# Patient Record
Sex: FEMALE | Race: OTHER | URBAN - METROPOLITAN AREA
[De-identification: names, ages, dates, MRNs, and addresses within clinical notes are randomized per-mention and may not be internally consistent; named-entity substitution may affect disease eponyms.]

---
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
## 2019-04-09 DIAGNOSIS — L70.0 ACNE VULGARIS: Chronic | ICD-10-CM

## 2019-04-09 DIAGNOSIS — N94.6 DYSMENORRHEA IN THE ADOLESCENT: Chronic | ICD-10-CM

## 2019-04-10 RX ORDER — NORGESTIMATE AND ETHINYL ESTRADIOL 7DAYSX3 28
1 KIT ORAL DAILY
Qty: 84 TAB | Refills: 3 | Status: SHIPPED | OUTPATIENT
Start: 2019-04-10 | End: 2019-10-04 | Stop reason: SDUPTHER

## 2019-04-10 RX ORDER — CLINDAMYCIN AND BENZOYL PEROXIDE 10; 50 MG/G; MG/G
GEL TOPICAL 2 TIMES DAILY
Qty: 25 G | Refills: 3 | Status: SHIPPED | OUTPATIENT
Start: 2019-04-10 | End: 2020-07-10

## 2019-10-03 NOTE — PATIENT INSTRUCTIONS
No change to your meds. Sent refills to your pharmacy. You will be due for your pap once you turn 20. Pelvic Exam: Care Instructions Your Care Instructions When your doctor examines all of your pelvic organs, it's called a pelvic exam. Two good reasons to have this kind of exam are to check for sexually transmitted infections (STIs) and to get a Pap test. A Pap test is also called a Pap smear. It checks for early changes that can lead to cancer of the cervix. Sometimes a pelvic exam is part of a regular checkup. In this case, you can do some things to make your test results as accurate as possible. · Try to schedule the exam when you don't have your period. · Don't use douches, tampons, or vaginal medicines, sprays, or powders for 24 hours before your exam. 
· Don't have sex for 24 hours before your exam. 
Other times, women have this kind of exam at any time of the month. This is because they have pelvic pain, bleeding, or discharge. Or they may have another pelvic problem. Before your exam, it's important to share some information with your doctor. For example, if you are a survivor of rape or sexual abuse, you can talk about any concerns you may have. Your doctor will also want to know if you are pregnant or use birth control. And he or she will want to hear about any problems, surgeries, or procedures you have had in your pelvic area. You will also need to tell your doctor when your last period was. Follow-up care is a key part of your treatment and safety. Be sure to make and go to all appointments, and call your doctor if you are having problems. It's also a good idea to know your test results and keep a list of the medicines you take. How is a pelvic exam done? · During a pelvic exam, you will: ? Take off your clothes below the waist. You will get a paper or cloth cover to put over the lower half of your body. ? Lie on your back on an exam table. Your feet will be raised above you. Stirrups will support your feet. · The doctor will: ? Ask you to relax your knees. Your knees need to lean out, toward the walls. ? Check the opening of your vagina for sores or swelling. ? Gently put a tool called a speculum into your vagina. It opens the vagina a little bit. You will feel some pressure. But if you are relaxed, it will not hurt. It lets your doctor see inside the vagina. ? Use a small brush, spatula, or swab to get a sample of cells, if you are having a Pap test or culture. The doctor then removes the speculum. ? Put on gloves and put one or two fingers of one hand into your vagina. The other hand goes on your lower belly. This lets your doctor feel your pelvic organs. You will probably feel some pressure. Try to stay relaxed. ? Put one gloved finger into your rectum and one into your vagina, if needed. This can also help check your pelvic organs. This exam takes about 10 minutes. At the end, you will get a washcloth or tissue to clean your vaginal area. It's normal to have some discharge after this exam. You can then get dressed. Some test results may be ready right away. But results from a culture or a Pap test may take several days or a few weeks. Why should you have a pelvic exam? 
· You want to have recommended screening tests. This includes a Pap test. 
· You think you have a vaginal infection. Signs include itching, burning, or unusual discharge. · You might have been exposed to a sexually transmitted infection (STI), such as chlamydia or herpes. · You have vaginal bleeding that is not part of your normal menstrual period. · You have pain in your belly or pelvis. · You have been sexually assaulted. A pelvic exam lets your doctor collect evidence and check for STIs. · You are pregnant. · You are having trouble getting pregnant. What are the risks of a pelvic exam? 
There are no risks from a pelvic exam. 
When should you call for help? Watch closely for changes in your health, and be sure to contact your doctor if you have any problems. Where can you learn more? Go to http://ayah-rosa.info/. Enter G280 in the search box to learn more about \"Pelvic Exam: Care Instructions. \" Current as of: February 19, 2019 Content Version: 12.2 © 9360-3671 [employer]. Care instructions adapted under license by [employer] (which disclaims liability or warranty for this information). If you have questions about a medical condition or this instruction, always ask your healthcare professional. [address] any warranty or liability for your use of this information.

## 2019-10-03 NOTE — PROGRESS NOTES
[address] [facility]  Primary Care Office Visit - Problem-Oriented    : 2000   Guillermo Mack is a 20 y.o. female presenting for  Chief Complaint   Patient presents with    Medication Evaluation            Assessment/Plan:     1. Dysmenorrhea in the adolescent  Well controlled. No change in current regimen. - norgestimate-ethinyl estradiol (ORTHO TRI-CYCLEN, TRI-SPRINTEC) 0.18/0.215/0.25 mg-35 mcg (28) tab; Take 1 Tab by mouth daily. Dispense: 84 Tab; Refill: 3    2. Exercise-induced asthma  Well controlled. No change to current regimen. Key COPD Medications             levalbuterol tartrate (XOPENEX HFA) 45 mcg/actuation inhaler (Taking) Take 2 Puffs by inhalation every four (4) hours as needed. 3. Anxiety and depression  Resolved. 4. Encounter for immunization  - INFLUENZA VIRUS VAC QUAD,SPLIT,PRESV FREE SYRINGE IM  - NV IMMUNIZ ADMIN,1 SINGLE/COMB VAC/TOXOID  - PNEUMOCOCCAL POLYSACCHARIDE VACCINE, 23-VALENT, ADULT OR IMMUNOSUPPRESSED PT DOSE,      Orders & Diagnoses associated with This Encounter:         ICD-10-CM ICD-9-CM   1. Dysmenorrhea in the adolescent N94.6 625.3   2. Exercise-induced asthma J45.990 493.81   3. Anxiety and depression F41.9 300.00    F32.9 311   4. Encounter for immunization Z23 V03.89       Orders Placed This Encounter    Influenza virus vaccine (QUADRIVALENT PRES FREE SYRINGE) IM (39605)    Pneumococcal polysaccharide vaccine, 23-valent, adult or immunosuppressed pt dose    NV IMMUNIZ ADMIN,1 SINGLE/COMB VAC/TOXOID    norgestimate-ethinyl estradiol (ORTHO TRI-CYCLEN, TRI-SPRINTEC) 0.18/0.215/0.25 mg-35 mcg (28) tab     Sig: Take 1 Tab by mouth daily. Dispense:  84 Tab     Refill:  3         This document may have been created with the aid of dictation software. Text may contain errors, particularly phonetic errors. Reviewed management plan & instructions with patient, who voiced understanding.          Daniel Manzo MD  Internal Medicine, Family Medicine & Sports Medicine  10/4/2019    Subjective   History:   Guy Lyons is a 20 y.o. female presenting to address:  Chief Complaint   Patient presents with    Medication Evaluation       Asthma controlled. Periods regular, 3-4 days, occasional cramps. Well controlled on OCP. Asthma well controlled as well. No issues with anxiety/depression at this time. Is going to be graduating school early, applying for grad school in either [employer] or [email or web address]. JESSICA 2/7 10/4/2019   Feeling nervous, anxious or on edge? 1   Not being able to stop or control worrying? 0   JESSICA-2 Subtotal 1   Worrying too much about different things? 2   Trouble relaxing? 2   Being so restless that it is hard to sit still? 0   Becoming easily annoyed or irritable? 1   Feeling afraid as if something awful might happen? 0   JESSICA-7 Total Score 6   If you checked off any problems, how difficult have these problems made it for you to do your work, take care of thinks at home, or get along with other people?   Somewhat difficult     3 most recent PHQ Screens 10/4/2019   Little interest or pleasure in doing things Several days   Feeling down, depressed, irritable, or hopeless Not at all   Total Score PHQ 2 1   Trouble falling or staying asleep, or sleeping too much Several days   Feeling tired or having little energy More than half the days   Poor appetite, weight loss, or overeating More than half the days   Feeling bad about yourself - or that you are a failure or have let yourself or your family down Not at all   Trouble concentrating on things such as school, work, reading, or watching TV Not at all   Moving or speaking so slowly that other people could have noticed; or the opposite being so fidgety that others notice Not at all   Thoughts of being better off dead, or hurting yourself in some way Not at all   PHQ 9 Score 6   How difficult have these problems made it for you to do your work, take care of your home and get along with others Somewhat difficult           Past Medical History:   Diagnosis Date    Asthma     exercise-induced; no hospitalizations, intubations    Closed fracture of distal phalanx of fourth finger of right hand - 1/05/2014 1/5/2014    Seen @ [facility] on 1/05/2014. No extension of fx into joint line. Roly taped.  Heavy menses     Pneumonia Jan 2010     Past Surgical History:   Procedure Laterality Date    HX OTHER SURGICAL  6th grade    cyst removed from right side of face    HX TONSILLECTOMY  age 10      reports that she has never smoked. She has never used smokeless tobacco. She reports that she does not drink alcohol or use drugs. Social History     Social History Narrative    Applied for grad school fullbright in [place] or [place]. Either international relations or [place] [employer]. (10/4/2019)     Social History     Tobacco Use   Smoking Status Never Smoker   Smokeless Tobacco Never Used     Family History   Problem Relation Age of Onset    Depression Father     Asthma Mother     Asthma Sister      No Known Allergies    Problem List:      Patient Active Problem List    Diagnosis    Vitamin D deficiency     - VitD 24.8 (11/20/2015) -> 50k weekly x 12wks      Routine health maintenance     *8/21/2014: UTD on vaccines  - AAP Bright[employer]ures handout given to mom & patient      Nail fungus     *8/21/2014: probable fungal infection of lateral aspect of right 5th finger nailbed  - trial of topical antifungal      Dysmenorrhea in the adolescent    Exercise-induced asthma     *8/21/2014:   - continue xopenex prn  - Asthma Action Plan completed & given to mom; copy scanned into chart        Allergic rhinitis    Acne       Medications:     Current Outpatient Medications   Medication Sig    norgestimate-ethinyl estradiol (ORTHO TRI-CYCLEN, TRI-SPRINTEC) 0.18/0.215/0.25 mg-35 mcg (28) tab Take 1 Tab by mouth daily.     clindamycin-benzoyl peroxide (BENZACLIN) 1-5 % topical gel Apply  to affected area two (2) times a day. Apply to affected area after the skin has been cleansed and dried.  levalbuterol tartrate (XOPENEX HFA) 45 mcg/actuation inhaler Take 2 Puffs by inhalation every four (4) hours as needed.  Inhalational Spacing Device (AEROCHAMBER MV) 1 Each by Does Not Apply route as needed. No current facility-administered medications for this visit. Review of Systems:     Review of Systems   Constitutional: Negative for chills and fever. HENT: Negative for ear pain and sore throat. Respiratory: Negative for cough and shortness of breath. Cardiovascular: Negative for chest pain and palpitations. Gastrointestinal: Negative for abdominal pain. Genitourinary: Negative for dysuria. Musculoskeletal: Negative for myalgias. Skin: Negative for rash. Neurological: Negative for speech change, focal weakness and headaches. Endo/Heme/Allergies: Does not bruise/bleed easily. Psychiatric/Behavioral: Negative for depression. The patient is not nervous/anxious and does not have insomnia. Objective   Physical Assessment:   VS:    Vitals:    10/04/19 0713   BP: 109/65   Pulse: 78   Resp: 16   Temp: 96.8 °F (36 °C)   TempSrc: Oral   SpO2: 99%   Weight: 140 lb 12.8 oz (63.9 kg)   Height: 5' 6.5\" (1.689 m)   PF: 520 L/min   PainSc:   0 - No pain   LMP: 09/18/2019       Physical Exam   Constitutional: She is oriented to person, place, and time. She appears well-developed and well-nourished. HENT:   Head: Normocephalic and atraumatic. Eyes: EOM are normal.   Neck: Neck supple. No thyromegaly present. Cardiovascular: Normal rate, regular rhythm and intact distal pulses. No murmur heard. Pulmonary/Chest: Effort normal and breath sounds normal. She has no wheezes. She has no rales. Musculoskeletal: Normal range of motion. Neurological: She is alert and oriented to person, place, and time.  No cranial nerve deficit. Coordination normal.   Skin: Skin is warm and dry. She is not diaphoretic. Psychiatric: She has a normal mood and affect. Her behavior is normal. Judgment and thought content normal.   Nursing note reviewed.

## 2019-10-04 ENCOUNTER — OFFICE VISIT (OUTPATIENT)
Dept: FAMILY MEDICINE CLINIC | Age: 19
End: 2019-10-04

## 2019-10-04 VITALS
TEMPERATURE: 96.8 F | HEART RATE: 78 BPM | WEIGHT: 140.8 LBS | OXYGEN SATURATION: 99 % | SYSTOLIC BLOOD PRESSURE: 109 MMHG | DIASTOLIC BLOOD PRESSURE: 65 MMHG | HEIGHT: 67 IN | BODY MASS INDEX: 22.1 KG/M2 | RESPIRATION RATE: 16 BRPM

## 2019-10-04 DIAGNOSIS — F32.A ANXIETY AND DEPRESSION: ICD-10-CM

## 2019-10-04 DIAGNOSIS — J45.990 EXERCISE-INDUCED ASTHMA: ICD-10-CM

## 2019-10-04 DIAGNOSIS — N94.6 DYSMENORRHEA IN THE ADOLESCENT: Primary | Chronic | ICD-10-CM

## 2019-10-04 DIAGNOSIS — F41.9 ANXIETY AND DEPRESSION: ICD-10-CM

## 2019-10-04 DIAGNOSIS — Z23 ENCOUNTER FOR IMMUNIZATION: ICD-10-CM

## 2019-10-04 RX ORDER — NORGESTIMATE AND ETHINYL ESTRADIOL 7DAYSX3 28
1 KIT ORAL DAILY
Qty: 84 TAB | Refills: 3 | Status: SHIPPED | OUTPATIENT
Start: 2019-10-04 | End: 2020-07-10 | Stop reason: SDUPTHER

## 2019-10-04 NOTE — PROGRESS NOTES
Tee Mancia is a 20 y.o. female (: 2000) presenting to address:    Chief Complaint   Patient presents with    Medication Evaluation       Vitals:    10/04/19 0713   BP: 109/65   Pulse: 78   Resp: 16   Temp: 96.8 °F (36 °C)   TempSrc: Oral   SpO2: 99%   Weight: 140 lb 12.8 oz (63.9 kg)   Height: 5' 6.5\" (1.689 m)   PF: 520 L/min   PainSc:   0 - No pain   LMP: 2019       Hearing/Vision:   No exam data present    Learning Assessment:     Learning Assessment 2015   PRIMARY LEARNER Mother   HIGHEST LEVEL OF EDUCATION - PRIMARY LEARNER  4 YEARS OF COLLEGE   BARRIERS PRIMARY LEARNER NONE   CO-LEARNER CAREGIVER Yes   CO-LEARNER NAME Patient   BARRIERS CO-LEARNER NONE   PRIMARY LANGUAGE ENGLISH   PRIMARY LANGUAGE CO-LEARNER ENGLISH    NEED No   LEARNER PREFERENCE PRIMARY READING   LEARNER PREFERENCE CO-LEARNER READING   LEARNING SPECIAL TOPICS no   ANSWERED BY self   RELATIONSHIP SELF     Depression Screening:     3 most recent PHQ Screens 3/21/2018   Little interest or pleasure in doing things Several days   Feeling down, depressed, irritable, or hopeless More than half the days   Total Score PHQ 2 3   Trouble falling or staying asleep, or sleeping too much More than half the days   Feeling tired or having little energy Several days   Poor appetite, weight loss, or overeating Nearly every day   Feeling bad about yourself - or that you are a failure or have let yourself or your family down Several days   Trouble concentrating on things such as school, work, reading, or watching TV Not at all   Moving or speaking so slowly that other people could have noticed; or the opposite being so fidgety that others notice Several days   Thoughts of being better off dead, or hurting yourself in some way Several days   PHQ 9 Score 12   How difficult have these problems made it for you to do your work, take care of your home and get along with others Somewhat difficult     Fall Risk Assessment: Fall Risk Assessment, last 12 mths 3/21/2018   Able to walk? Yes   Fall in past 12 months? No     Abuse Screening:     Abuse Screening Questionnaire 3/21/2018   Do you ever feel afraid of your partner? N   Are you in a relationship with someone who physically or mentally threatens you? N   Is it safe for you to go home? Y     Coordination of Care Questionaire:   1. Have you been to the ER, urgent care clinic since your last visit? Hospitalized since your last visit? NO    2. Have you seen or consulted any other health care providers outside of the [address] [place] [address] since your last visit? Include any pap smears or colon screening. [facility] doctor    Advanced Directive:   1. Do you have an Advanced Directive? NO    2. Would you like information on Advanced Directives? NO    Flu & Pneumo 23 Immunization/s administered 10/4/2019 by Estrellita Rao LPN with patients consent. Patient tolerated procedure well. No reactions noted.

## 2020-07-10 ENCOUNTER — VIRTUAL VISIT (OUTPATIENT)
Dept: FAMILY MEDICINE CLINIC | Age: 20
End: 2020-07-10

## 2020-07-10 DIAGNOSIS — L70.0 ACNE VULGARIS: ICD-10-CM

## 2020-07-10 DIAGNOSIS — J45.990 EXERCISE-INDUCED ASTHMA: ICD-10-CM

## 2020-07-10 DIAGNOSIS — N94.6 DYSMENORRHEA IN THE ADOLESCENT: Primary | Chronic | ICD-10-CM

## 2020-07-10 RX ORDER — NORGESTIMATE AND ETHINYL ESTRADIOL 7DAYSX3 28
1 KIT ORAL DAILY
Qty: 84 TAB | Refills: 3 | Status: SHIPPED | OUTPATIENT
Start: 2020-07-10 | End: 2021-08-04

## 2020-07-10 RX ORDER — ADAPALENE 45 G/G
GEL TOPICAL
Qty: 45 G | Refills: 0 | Status: SHIPPED | OUTPATIENT
Start: 2020-07-10 | End: 2021-09-30 | Stop reason: SDUPTHER

## 2020-07-10 NOTE — PROGRESS NOTES
[address] [place] [address]  Primary Care Office Visit - Telemedicine Problem-Oriented Note    Consent: Tanmay Das, who was seen by synchronous (real-time) audio only technology, and/or her healthcare decision maker, is aware that this patient-initiated, Telehealth encounter on 7/10/2020 is a billable service, with coverage as determined by her insurance carrier. She is aware that she may receive a bill and has provided verbal consent to proceed: Yes. This service was provided through telehealth via [employer]. me, both the Patient Home and [address] [place] [address]. .      Assessment & Plan:       ICD-10-CM ICD-9-CM   1. Dysmenorrhea in the adolescent  N94.6 625.3   2. Acne vulgaris  L70.0 706.1   3. Exercise-induced asthma  J45.990 493.81     Ongoing, stable Dx #1 and #3. No change to current regimen. Dx #2: not well controlled  Trial of topical retinoid    Orders Placed This Encounter    norgestimate-ethinyl estradioL (ORTHO TRI-CYCLEN, TRI-SPRINTEC) 0.18/0.215/0.25 mg-35 mcg (28) tab     Sig: Take 1 Tab by mouth daily. Dispense:  84 Tab     Refill:  3    adapalene (DIFFERIN) 0.1 % topical gel     Sig: Apply  to affected area nightly. use small amount as directed     Dispense:  45 g     Refill:  0       Total telephone time = 22min      712  Subjective:   Tanmay Das is a 23 y.o. female who was seen for No chief complaint on file. 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 In [place] right now. 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 Notes that [employer] plans on having residential classes at this point (in light of current COVID pandemic). Since last visit:  Mild concussion - sx x 1wk, mild dizziness and headache. No ongoing symptoms. Menses quite well controlled on OCPs. Would like to continue.     Notes that acne has not responded to medications in the past (benzoyl peroxide / clindamycin). Would like to try something else topically. Asthma well controlled. Can't recall last use of xopenex. Prior to Admission medications    Medication Sig Start Date End Date Taking? Authorizing Provider   norgestimate-ethinyl estradiol (ORTHO TRI-CYCLEN, TRI-SPRINTEC) 0.18/0.215/0.25 mg-35 mcg (28) tab Take 1 Tab by mouth daily. 10/4/19   Tania Cummings MD   clindamycin-benzoyl peroxide (BENZACLIN) 1-5 % topical gel Apply  to affected area two (2) times a day. Apply to affected area after the skin has been cleansed and dried. 4/10/19   Tania uCmmings MD   levalbuterol tartrate (XOPENEX HFA) 45 mcg/actuation inhaler Take 2 Puffs by inhalation every four (4) hours as needed. 12/24/18   Natan Hackett MD   Inhalational Spacing Device (AEROCHAMBER MV) 1 Each by Does Not Apply route as needed. 8/22/13   Natan Hackett MD     No Known Allergies    Patient Active Problem List    Diagnosis Date Noted    Vitamin D deficiency 11/20/2015    Routine health maintenance 08/21/2014    Nail fungus 08/21/2014    Dysmenorrhea in the adolescent 08/21/2014    Exercise-induced asthma 08/22/2013    Allergic rhinitis 08/22/2013    Acne 08/22/2013     Past Medical History:   Diagnosis Date    Asthma     exercise-induced; no hospitalizations, intubations    Closed fracture of distal phalanx of fourth finger of right hand - 1/05/2014 1/5/2014    Seen @ [facility] on 1/05/2014. No extension of fx into joint line. Buddy taped.     Heavy menses     Pneumonia Jan 2010     Past Surgical History:   Procedure Laterality Date    HX OTHER SURGICAL  6th grade    cyst removed from right side of face    HX TONSILLECTOMY  age 10     Family History   Problem Relation Age of Onset    Depression Father     Asthma Mother     Asthma Sister      Social History     Tobacco Use    Smoking status: Never Smoker    Smokeless tobacco: Never Used   Substance Use Topics    Alcohol use: No     Alcohol/week: 0.0 standard drinks       Review of Systems   Constitutional: Negative for chills and fever. HENT: Negative for ear pain and sore throat. Respiratory: Negative for cough and shortness of breath. Cardiovascular: Negative for chest pain and palpitations. Gastrointestinal: Negative for abdominal pain. Genitourinary: Negative for dysuria. Musculoskeletal: Negative for myalgias. Skin: Negative for rash.        + acne per HPI   Neurological: Negative for speech change, focal weakness and headaches. Endo/Heme/Allergies: Does not bruise/bleed easily. Psychiatric/Behavioral: Negative for depression. The patient is not nervous/anxious and does not have insomnia. Objective:   Vital Signs: (As obtained by patient/caregiver at home)  There were no vitals taken for this visit. Physical Exam  Constitutional:       Comments: ( audio only - physical exam not performed )              We discussed the expected course, resolution and complications of the diagnosis(es) in detail. Medication risks, benefits, costs, interactions, and alternatives were discussed as indicated. I advised her to contact the office if her condition worsens, changes or fails to improve as anticipated. She expressed understanding with the diagnosis(es) and plan. Mauri Guadarrama is a 23 y.o. female who was evaluated by an audio only encounter for concerns as above. Patient identification was verified prior to start of the visit. A caregiver was present when appropriate. Due to this being a TeleHealth encounter ([facility]), evaluation of the following organ systems was limited: Vitals/Constitutional/EENT/Resp/CV/GI//MS/Neuro/Skin/Heme-Lymph-Imm.   Pursuant to the emergency declaration under the [facility], [address] [place] [address] authority and the [employer] Act, this Virtual Visit was conducted, with patient's (and/or legal guardian's) consent, to reduce the patient's risk of exposure to COVID-19 and provide necessary medical care. Services were provided through a synchronous discussion virtually to substitute for in-person clinic visit. I was in the office. The patient was at home.       Joseph Cuevas MD  Internal Medicine, Family Medicine & Sports Medicine

## 2020-09-13 DIAGNOSIS — N94.6 DYSMENORRHEA IN THE ADOLESCENT: Chronic | ICD-10-CM

## 2020-09-14 RX ORDER — NORGESTIMATE AND ETHINYL ESTRADIOL 7DAYSX3 28
1 KIT ORAL DAILY
Qty: 84 TAB | Refills: 3 | OUTPATIENT
Start: 2020-09-14

## 2021-02-18 NOTE — TELEPHONE ENCOUNTER
2/18/2021         RE: Juaquin Shea  [address]  [facility] [address]        Dear Colleague,    Thank you for referring your patient, Juaquin Shea, to the [facility] [place]. Please see a copy of my visit note below.    ORTHOPEDIC CLINIC CONSULT    HPI: Patient reports for months to years he has right hand carpal tunnel symptoms have returned.  Reports it is often noticeable when he is driving as well as when he is working on puzzle books.  He reports the first 3 fingers do get numb.  He has had surgery twice on his right for carpal tunnel.  The most recent surgery was in 2011 by Dr. Javon Edward.  His op note also indicates that a right middle and right ring trigger fingers were released.  He also notes that this was a second surgery for the patient for carpal tunnel release.  Patient also reports a painful nodular area at the right left palmar surface below the pinky.  This too has been present for years.    Referred by: Dr. Howard    Chief Complaint: Juaquin Seha is a 81 year old male who is being seen for   Chief Complaint   Patient presents with     Consult For     Right Dupuytrens contracture.     History of Present Illness:   Mechanism of Injury: No trauma or inciting event.  Location: right palm volar  Duration of Pain:    years(s)  Rating of Pain:  moderate.    Pain Quality: sharp, electric shock and pressure, occasional burning pain at site of nodule  Pain is better with: shaking it off or placing pressure  Pain is worse with:  bending, repetitive tasks, driving and working on puzzle books  Treatment so far consists of:  Previous surgery for carpal tunnel release x 2 and trigger fingers.   Prior history of related problems:  YES    Has had surgery in the affected area before.  Per notes also appears a right hand mass excised in 2013 per Dr. Moore and 2011 Trigger release of middle and ring finger and repeat carpal tunnel release of right hand  Pain is limiting  Pt called  Back and said to call her on her cellphone instead    [phone] normal activities of daily living.  Here for Orthopedic consultation.    Patient's past medical, surgical, social and family histories reviewed.       Past Medical History:   Diagnosis Date     Benign neoplasm of colon 2007    adenomatous colon polyps removed     Degeneration of cervical intervertebral disc 2002    multi-level     Degeneration of lumbar or lumbosacral intervertebral disc 2007    multi-level     Erectile dysfunction      Hypertrophy of breast 2004    benign US consistent with gynecomastia     Impaired fasting glucose 2006    fasting      LVH (left ventricular hypertrophy) 5/17/10    suspected, by voltage criteria on ECG     Microalbuminuria 5/18/2010     Nonspecific abnormal results of liver function study 2006         Obesity      Other and unspecified hyperlipidemia 2006     Spinal stenosis, lumbar region, without neurogenic claudication 2007    mod-severe at L2-3 on MRI     Tinnitus 5/17/2010       Past Surgical History:   Procedure Laterality Date     ARTHROPLASTY KNEE  2/20/2012    hemiarthroplasty right knee     ARTHROPLASTY KNEE  9/30/2013    Procedure: ARTHROPLASTY KNEE;  Left Total Knee Arthroplasty;  Surgeon: Jose D Siegel MD;  Location: [facility] OR     COLONOSCOPY  03/27/07, 06/08/10     COLONOSCOPY N/A 6/16/2015    Procedure: COLONOSCOPY;  Surgeon: Yg Rendon MD;  Location:  GI     EXCISE MASS HAND  3/29/2013    Procedure: EXCISE MASS HAND;  excision right hand mass;  Surgeon: Rafa Moore MD;  Location: [facility] OR     HC COLONOSCOPY W/WO BRUSH/WASH  2000     HC COLONOSCOPY W/WO BRUSH/WASH  2004     HC REPAIR ING HERNIA,5+Y/O,REDUCIBL      X2     HC REVISE MEDIAN N/CARPAL TUNNEL SURG  5/11/2000    left wrist     HC REVISE MEDIAN N/CARPAL TUNNEL SURG  7/21/1994    right wrist     RELEASE CARPAL TUNNEL  12/8/2011    Procedure:RELEASE CARPAL TUNNEL; right carpal tunnel release, right ring and middle finger A-1 pulley releases    ; Surgeon:BARRETT CURRY; Location:[facility]  OR     RELEASE TRIGGER FINGER  2011    Procedure:RELEASE TRIGGER FINGER; Surgeon:BARRETT CURRY; Location:[facility] OR       Home Medications:  Prior to Admission medications    Medication Sig Start Date End Date Taking? Authorizing Provider   ascorbic acid (VITAMIN C) 1000 MG TABS Take 1 tablet by mouth daily. 9/17/10   Yg Morgan PA-C   aspirin 81 MG tablet Take 1 tablet by mouth daily. 9/17/10   Yg Morgan PA-C   losartan (COZAAR) 25 MG tablet TAKE 1 TABLET EVERY DAY 2/10/21   Timo Howard MD   Multiple Vitamins-Minerals (MULTIVITAMIN ADULT PO)     Reported, Patient   pravastatin (PRAVACHOL) 40 MG tablet TAKE 1 TABLET EVERY DAY 2/10/21   Timo Howard MD   tamsulosin (FLOMAX) 0.4 MG capsule Take 2 capsules (0.8 mg) by mouth daily 2/10/21   Timo Howard MD       Allergies   Allergen Reactions     No Known Drug Allergies        Social History     Occupational History     Occupation: retired   Tobacco Use     Smoking status: Former Smoker     Packs/day: 0.50     Years: 15.00     Pack years: 7.50     Quit date: 1981     Years since quittin.1     Smokeless tobacco: Former User     Types: Chew     Quit date: 2/10/2017     Tobacco comment: quit in    Substance and Sexual Activity     Alcohol use: Yes     Comment: beer occ     Drug use: No     Sexual activity: Yes     Partners: Female       Family History   Problem Relation Age of Onset     Heart Disease Mother         doctored for heart problems at young age     Prostate Cancer Father         age 70s     Stomach Cancer Brother         Cancer free now for over 10 years (T: 8/10/17)     Prostate Cancer Brother      Other - See Comments Brother         [place] virus fighting for about 6 weeks 10/08/18     Respiratory Brother         NE, uses CPAP     Cancer - colorectal No family hx of        REVIEW OF SYSTEMS    General: Negative for fever or fatigue    Psych:  negative anxiety or depression     Ophthalmic:   "Corrective lenses?  YES    ENT:  Hearing difficulty? No    CV: negative for chest pain, venous insufficiency, no history of MI or stroke    Endocrine:  negative diabetes     Urology:  negative kidney disease    Resp:  Normal respiratory effort, no history of pulmonary disease or asthma    Skin: negative for cuts/sores or redness    Musculoskeletal: as above    Neurologic:positive for numbness/tingling    Hematologic: negative for bleeding disorder, does not use of prescription anticoagulants         Physical Exam:    Vitals: BP (!) 153/72   Pulse 90   Resp 16   Ht 1.702 m (5' 7\")   Wt 103.9 kg (229 lb)   BMI 35.87 kg/m    BMI= Body mass index is 35.87 kg/m .    GENERAL APPEARANCE: Healthy, alert, no distress    SKIN:  negative for suspicious lesions or rashes    NEURO: Normal strength and tone, mentation intact and speech normal    PSYCH:   Mentation appears Normal and affect normal/bright    RESPIRATORY: negative for respiratory distress.    EYES: negative for Conjunctivitis    Cardiovascular: No vascular discoloration;  Fingers warm and well perfused, brisk capillary refill.    JOINT/EXTREMITIES:  right Hand/Finger Exam: Inspection: nodule palpated volar brown surface proximal to the pinky.   This appears softer and less attached to skin than a typical Dupuytren's contracture. Transverse incision scar upper 3rd of palmar surface near index.  Mildly swolllen wrist.  Tender: brown nodule tender to touch.    Positive Tinels at wrist.  Well healed wrist scar for carpal tunnel release.  Range of Motion full extension and flexion of all fingers of right hand.  Symmetrical range of motion at wrist.  Fingers cannot hyperextend at metacarpal phalangeal joints.      Impression:      ICD-10-CM    1. Carpal tunnel syndrome of right wrist - recurrent  G56.01 Orthopedic & Spine  Referral    Has had CTR x 2.  Most recent 2011   2. Dupuytren's contracture of right hand  M72.0 Orthopedic & Spine  Referral "     Orthopedic & Spine  Referral     Patient with recurrent carpal tunnel symptoms despite surgery 2 times.  Patient also with palmar nodule at the palmar base of the pinky.  There is minimal contracture. I am not certain this is Dupuytren's contracture, as it feels softer and less adherent to skin than typical.    Plan:  All of the above pertinent physical exam and imaging modalities findings was reviewed with Juaquin.  Plan is for Ortho  referral to a hand surgeon to address recurrent carpal tunnel symptoms as well as palmar nodule?  Dupuytren's contracture to the palmar surface near the pinky.  Surgeon we would like to refer to is Dr. Hussein Grace out of [place].  Patient would like carpal tunnel symptoms addressed.  May consider Xiaflex  injection for early Dupuytren's symptoms.    BP Readings from Last 1 Encounters:   02/18/21 (!) 153/72       BP noted to be elevated today in office.  Patient to follow up with Primary Care provider regarding elevated blood pressure.    Scribed by:  Muna Herrera PA-C   2/18/2021  9:10 AM    The information in this document, created by a scribe for me, accurately reflects the services I personally performed and the decisions made by me. I have reviewed and approved this document for accuracy.    Dr. Michel Garcia MD        Again, thank you for allowing me to participate in the care of your patient.        Sincerely,        Michel Garcia MD

## 2021-08-04 DIAGNOSIS — N94.6 DYSMENORRHEA IN THE ADOLESCENT: Chronic | ICD-10-CM

## 2021-08-04 RX ORDER — NORGESTIMATE AND ETHINYL ESTRADIOL 7DAYSX3 28
KIT ORAL
Qty: 84 TABLET | Refills: 3 | Status: SHIPPED | OUTPATIENT
Start: 2021-08-04 | End: 2022-05-18

## 2021-09-30 ENCOUNTER — OFFICE VISIT (OUTPATIENT)
Dept: FAMILY MEDICINE CLINIC | Age: 21
End: 2021-09-30
Payer: COMMERCIAL

## 2021-09-30 VITALS
TEMPERATURE: 98.3 F | OXYGEN SATURATION: 98 % | BODY MASS INDEX: 21.35 KG/M2 | WEIGHT: 136 LBS | RESPIRATION RATE: 16 BRPM | DIASTOLIC BLOOD PRESSURE: 79 MMHG | HEIGHT: 67 IN | SYSTOLIC BLOOD PRESSURE: 130 MMHG | HEART RATE: 61 BPM

## 2021-09-30 DIAGNOSIS — Z51.81 MEDICATION MONITORING ENCOUNTER: Primary | ICD-10-CM

## 2021-09-30 DIAGNOSIS — Z13.29 SCREENING FOR THYROID DISORDER: ICD-10-CM

## 2021-09-30 DIAGNOSIS — L70.0 ACNE VULGARIS: ICD-10-CM

## 2021-09-30 DIAGNOSIS — Z11.3 SCREEN FOR STD (SEXUALLY TRANSMITTED DISEASE): ICD-10-CM

## 2021-09-30 DIAGNOSIS — J45.990 EXERCISE-INDUCED ASTHMA: ICD-10-CM

## 2021-09-30 DIAGNOSIS — Z13.21 ENCOUNTER FOR VITAMIN DEFICIENCY SCREENING: ICD-10-CM

## 2021-09-30 DIAGNOSIS — Z23 ENCOUNTER FOR IMMUNIZATION: ICD-10-CM

## 2021-09-30 DIAGNOSIS — Z13.220 SCREENING, LIPID: ICD-10-CM

## 2021-09-30 DIAGNOSIS — Z23 NEEDS FLU SHOT: ICD-10-CM

## 2021-09-30 PROCEDURE — 90471 IMMUNIZATION ADMIN: CPT | Performed by: STUDENT IN AN ORGANIZED HEALTH CARE EDUCATION/TRAINING PROGRAM

## 2021-09-30 PROCEDURE — 90686 IIV4 VACC NO PRSV 0.5 ML IM: CPT | Performed by: STUDENT IN AN ORGANIZED HEALTH CARE EDUCATION/TRAINING PROGRAM

## 2021-09-30 PROCEDURE — 99214 OFFICE O/P EST MOD 30 MIN: CPT | Performed by: STUDENT IN AN ORGANIZED HEALTH CARE EDUCATION/TRAINING PROGRAM

## 2021-09-30 RX ORDER — ALBUTEROL SULFATE 90 UG/1
1 AEROSOL, METERED RESPIRATORY (INHALATION)
Qty: 18 G | Refills: 4 | Status: SHIPPED | OUTPATIENT
Start: 2021-09-30

## 2021-09-30 RX ORDER — ADAPALENE 45 G/G
GEL TOPICAL
Qty: 45 G | Refills: 1 | Status: SHIPPED | OUTPATIENT
Start: 2021-09-30

## 2021-09-30 RX ORDER — DOXYCYCLINE 100 MG/1
100 CAPSULE ORAL DAILY
Qty: 30 CAPSULE | Refills: 1 | Status: SHIPPED | OUTPATIENT
Start: 2021-09-30 | End: 2021-10-30

## 2021-09-30 NOTE — PROGRESS NOTES
Darleen Navarro is a 24 y.o. female (: 2000) presenting to address:    Chief Complaint   Patient presents with    Establish Care     acne       Vitals:    21 1054   BP: 130/79   Pulse: 61   Resp: 16   Temp: 98.3 °F (36.8 °C)   TempSrc: Temporal   SpO2: 98%   Weight: 136 lb (61.7 kg)   Height: 5' 6.5\" (1.689 m)   LMP: 2021       Hearing/Vision:   No exam data present    Learning Assessment:     Learning Assessment 2015   PRIMARY LEARNER Mother   HIGHEST LEVEL OF EDUCATION - PRIMARY LEARNER  4 YEARS OF COLLEGE   BARRIERS PRIMARY LEARNER NONE   CO-LEARNER CAREGIVER Yes   [address] NAME Patient   BARRIERS CO-LEARNER NONE   PRIMARY LANGUAGE ENGLISH   PRIMARY LANGUAGE CO-LEARNER ENGLISH    NEED No   LEARNER PREFERENCE PRIMARY READING   LEARNER PREFERENCE CO-LEARNER READING   LEARNING SPECIAL TOPICS no   ANSWERED BY self   RELATIONSHIP SELF     Depression Screening:     3 most recent PHQ Screens 2021   Little interest or pleasure in doing things Not at all   Feeling down, depressed, irritable, or hopeless Not at all   Total Score PHQ 2 0   Trouble falling or staying asleep, or sleeping too much -   Feeling tired or having little energy -   Poor appetite, weight loss, or overeating -   Feeling bad about yourself - or that you are a failure or have let yourself or your family down -   Trouble concentrating on things such as school, work, reading, or watching TV -   Moving or speaking so slowly that other people could have noticed; or the opposite being so fidgety that others notice -   Thoughts of being better off dead, or hurting yourself in some way -   PHQ 9 Score -   How difficult have these problems made it for you to do your work, take care of your home and get along with others -     Fall Risk Assessment:     Fall Risk Assessment, last 12 mths 3/21/2018   Able to walk? Yes   Fall in past 12 months?  No     Abuse Screening:     Abuse Screening Questionnaire 2021   Do you ever feel afraid of your partner? N   Are you in a relationship with someone who physically or mentally threatens you? N   Is it safe for you to go home? Y     Coordination of Care Questionaire:   1. Have you been to the ER, urgent care clinic since your last visit? Hospitalized since your last visit? no    2. Have you seen or consulted any other health care providers outside of the [address] [place] [address] since your last visit? Include any pap smears or colon screening. no    Advanced Directive:   1. Do you have an Advanced Directive? No    2. Would you like information on Advanced Directives? no    Pt wants flu shot  Immunization/s administered 9/30/2021 by Sade Crum with guardian's consent. Patient tolerated procedure well. No reactions noted.   Flulaval left deltoid    Health Maintenance Due   Topic Date Due    Pap Smear  Never done    DTaP/Tdap/Td series (7 - Td or Tdap) 03/14/2020    Flu Vaccine (1) 09/01/2021

## 2021-09-30 NOTE — PATIENT INSTRUCTIONS
· Check site, vaccines. gov and enter [facility] for local administering of Covid booster. · Topical tretinoin should not be applied at the same time as benzoyl peroxide. If both agents are prescribed, benzoyl peroxide should be applied in the morning and tretinoin in the evening. Vaccine Information Statement    Influenza (Flu) Vaccine (Inactivated or Recombinant): What You Need to Know    Many vaccine information statements are available in [place] and other languages. See www.immunize.org/vis. Hojas de información sobre vacunas están disponibles en español y en muchos otros idiomas. Visite www.immunize.org/vis. 1. Why get vaccinated? Influenza vaccine can prevent influenza (flu). Flu is a contagious disease that spreads around the [place] every year, usually between October and May. Anyone can get the flu, but it is more dangerous for some people. Infants and young children, people 72 years and older, pregnant people, and people with certain health conditions or a weakened immune system are at greatest risk of flu complications. Pneumonia, bronchitis, sinus infections, and ear infections are examples of flu-related complications. If you have a medical condition, such as heart disease, cancer, or diabetes, flu can make it worse. Flu can cause fever and chills, sore throat, muscle aches, fatigue, cough, headache, and runny or stuffy nose. Some people may have vomiting and diarrhea, though this is more common in children than adults. In an average year, thousands of people in the [facility] die from flu, and many more are hospitalized. Flu vaccine prevents millions of illnesses and flu-related visits to the doctor each year. 2. Influenza vaccines     CDC recommends everyone 6 months and older get vaccinated every flu season. Children 6 months through 6years of age may need 2 doses during a single flu season. Everyone else needs only 1 dose each flu season.     It takes about 2 weeks for protection to develop after vaccination. There are many flu viruses, and they are always changing. Each year a new flu vaccine is made to protect against the influenza viruses believed to be likely to cause disease in the upcoming flu season. Even when the vaccine doesnt exactly match these viruses, it may still provide some protection. Influenza vaccine does not cause flu. Influenza vaccine may be given at the same time as other vaccines. 3. Talk with your health care provider    Tell your vaccination provider if the person getting the vaccine:  Has had an allergic reaction after a previous dose of influenza vaccine, or has any severe, life-threatening allergies   Has ever had Guillain-Barré Syndrome (also called GBS)    In some cases, your health care provider may decide to postpone influenza vaccination until a future visit. Influenza vaccine can be administered at any time during pregnancy. People who are or will be pregnant during influenza season should receive inactivated influenza vaccine. People with minor illnesses, such as a cold, may be vaccinated. People who are moderately or severely ill should usually wait until they recover before getting influenza vaccine. Your health care provider can give you more information. 4. Risks of a vaccine reaction    Soreness, redness, and swelling where the shot is given, fever, muscle aches, and headache can happen after influenza vaccination. There may be a very small increased risk of Guillain-Barré Syndrome (GBS) after inactivated influenza vaccine (the flu shot). Rhonda Pantoja children who get the flu shot along with pneumococcal vaccine (PCV13) and/or DTaP vaccine at the same time might be slightly more likely to have a seizure caused by fever. Tell your health care provider if a child who is getting flu vaccine has ever had a seizure. People sometimes faint after medical procedures, including vaccination.  Tell your provider if you feel dizzy or have vision changes or ringing in the ears. As with any medicine, there is a very remote chance of a vaccine causing a severe allergic reaction, other serious injury, or death. 5. What if there is a serious problem? An allergic reaction could occur after the vaccinated person leaves the clinic. If you see signs of a severe allergic reaction (hives, swelling of the face and throat, difficulty breathing, a fast heartbeat, dizziness, or weakness), call 9-1-1 and get the person to the nearest hospital.    For other signs that concern you, call your health care provider. Adverse reactions should be reported to the Vaccine Adverse Event Reporting System (VAERS). Your health care provider will usually file this report, or you can do it yourself. Visit the VAERS website at www.vaers. hhs.gov or call 0-937.759.7910. VAERS is only for reporting reactions, and VAERS staff members do not give medical advice. 6. The National Vaccine Injury Compensation Program    The [facility] Vaccine Injury Compensation Program (VICP) is a federal program that was created to compensate people who may have been injured by certain vaccines. Claims regarding alleged injury or death due to vaccination have a time limit for filing, which may be as short as two years. Visit the VICP website at www.hrsa.gov/vaccinecompensation or call 3-607.538.3436 to learn about the program and about filing a claim. 7. How can I learn more? Ask your health care provider. Call your local or state health department. Visit the website of the Food and Drug Administration (FDA) for vaccine package inserts and additional information at www.fda.gov/vaccines-blood-biologics/vaccines. Contact the Centers for Disease Control and Prevention (CDC): Call 1-991.324.4090 (2-966-SYQ-INFO) or  Visit CDCs influenza website at www.cdc.gov/flu. Vaccine Information Statement   Inactivated Influenza Vaccine   8/6/2021  42 CHRISGeo Canseco 311JQ-01 Department of Health and Human Services  Centers for Disease Control and Prevention    Office Use Only    ·

## 2022-03-01 NOTE — PROGRESS NOTES
Note to patient:  The purpose of this note is to communicate optimally with the other physicians / APCs involved in your care. It is written using standard medical terminology. If you have questions regarding the details of the note, please contact my office to schedule an appointment to address your questions. Bakari Elizondo  Primary Care Office Visit - Problem-Oriented    : 2000   May Marquez is a 20 y.o. female presenting for  Chief Complaint   Patient presents with   [place] Exam            Assessment/Plan:       ICD-10-CM ICD-9-CM   1. Cervical cancer screening  Z12.4 V76.2   2. Acne vulgaris  L70.0 706.1   3. Discharge of cervix  N88.8 623.5     #Acne vulgaris  Hx of #Cystic acne with #scarring  Reviewed conservative treatments and provided instructions. Discussed consideration of topical treatment and or oral treatment. Given limited response to initial topical treatments and hx of scarring will start combination therapy:  Topical Clindamycin/benzoyl peroxide   With PO Doxycycline 100mg daily   Discussed consideration of Dermatology referral. Interested in establishing care and will try treatments before eval for review with specialist.     Exercise induced asthma - well controlled with pretreatment use of Albuterol  Refills provided     Health maintenance   #Cervical CA screening - Initial PAP performed in office 3/3/22    Orders Placed This Encounter    [address] [place] [address]     Standing Status:   Future     Standing Expiration Date:   3/4/2023    REFERRAL TO DERMATOLOGY     Referral Priority:   Routine     Referral Type:   Consultation     Referral Reason:   Specialty Services Required     Requested Specialty:   Dermatology     Number of Visits Requested:   1    PAP IG, CT-NG, RFX APTIMA HPV ASCUS ([id number], [id number])     Standing Status:   Future     Standing Expiration Date:   3/4/2023     Order Specific Question:   Pap Source?      Answer:   Cervical     Order Specific Question:   Total Hysterectomy? Answer:   No     Order Specific Question:   Supracervical Hysterectomy? Answer:   No     Order Specific Question:   Post Menopausal?     Answer:   No     Order Specific Question:   Hormone Therapy? Answer:   No     Order Specific Question:   IUD? Answer:   No     Order Specific Question:   Abnormal Bleeding? Answer:   No     Order Specific Question:   Pregnant     Answer:   No     Order Specific Question:   Post Partum? Answer:   No     Order Specific Question:   Pap collection method? Answer:   Brush-Spatula       Follow-up and Dispositions    · Return for when able to update labs for health maintenance or sooner if needed. Reviewed management plan & instructions with patient, who voiced understanding. Subjective   History:   Asia Shaw is a 20 y.o. female presenting to address:  Chief Complaint   Patient presents with   [facility] PCP visit: 9/30/2021    Since last visit:   Any changes in medication, procedures, hospital visits, or specialist visits? Denies  Tolerating medications without adverse reactions? Reports good compliance with Rx without notable adverse effects. Review of Systems:     A comprehensive review of systems was negative except for that written in the HPI and A/P         Objective     Physical Assessment:     Visit Vitals  /70 (BP 1 Location: Right arm, BP Patient Position: Sitting, BP Cuff Size: Adult)   Pulse 89   Temp 98.2 °F (36.8 °C) (Temporal)   Resp 16   Ht 5' 6.5\" (1.689 m)   Wt 136 lb 6.4 oz (61.9 kg)   LMP 02/26/2022   SpO2 98%   BMI 21.69 kg/m²       Physical Exam  Vitals and nursing note reviewed. Constitutional:       Appearance: Normal appearance. Cardiovascular:      Rate and Rhythm: Normal rate. Pulses: Normal pulses. Pulmonary:      Effort: Pulmonary effort is normal. No respiratory distress. Genitourinary:     General: Normal vulva.       Exam position: Lithotomy position. Pubic Area: No rash. Vagina: Normal. No bleeding. Cervix: Discharge present. No cervical bleeding. Skin:     Comments: Comedones noted on face   Neurological:      Mental Status: She is alert. Psychiatric:         Mood and Affect: Mood normal.         Behavior: Behavior normal.         Thought Content: Thought content normal.         Judgment: Judgment normal.                 This document may have been created with the aid of dictation software. Text may contain errors, particularly phonetic errors.       Lo Warner DO  Family Medicine  03/03/2022

## 2022-03-03 ENCOUNTER — HOSPITAL ENCOUNTER (OUTPATIENT)
Dept: LAB | Age: 22
Discharge: HOME | End: 2022-03-03
Payer: COMMERCIAL

## 2022-03-03 ENCOUNTER — OFFICE VISIT (OUTPATIENT)
Dept: FAMILY MEDICINE CLINIC | Age: 22
End: 2022-03-03
Payer: COMMERCIAL

## 2022-03-03 VITALS
RESPIRATION RATE: 16 BRPM | TEMPERATURE: 98.2 F | WEIGHT: 136.4 LBS | HEIGHT: 67 IN | OXYGEN SATURATION: 98 % | DIASTOLIC BLOOD PRESSURE: 70 MMHG | BODY MASS INDEX: 21.41 KG/M2 | SYSTOLIC BLOOD PRESSURE: 110 MMHG | HEART RATE: 89 BPM

## 2022-03-03 DIAGNOSIS — L70.0 ACNE VULGARIS: ICD-10-CM

## 2022-03-03 DIAGNOSIS — N88.8 DISCHARGE OF CERVIX: ICD-10-CM

## 2022-03-03 DIAGNOSIS — Z12.4 CERVICAL CANCER SCREENING: Primary | ICD-10-CM

## 2022-03-03 PROCEDURE — 87591 N.GONORRHOEAE DNA AMP PROB: CPT

## 2022-03-03 PROCEDURE — 88175 CYTOPATH C/V AUTO FLUID REDO: CPT

## 2022-03-03 PROCEDURE — 99214 OFFICE O/P EST MOD 30 MIN: CPT | Performed by: STUDENT IN AN ORGANIZED HEALTH CARE EDUCATION/TRAINING PROGRAM

## 2022-03-03 PROCEDURE — 87491 CHLMYD TRACH DNA AMP PROBE: CPT

## 2022-03-03 NOTE — PROGRESS NOTES
Oni Lira is a 20 y.o. female (: 2000) presenting to address:    Chief Complaint   Patient presents with    Gyn Exam       Vitals:    22 0727   BP: 110/70   Pulse: 89   Resp: 16   Temp: 98.2 °F (36.8 °C)   TempSrc: Temporal   SpO2: 98%   Weight: 136 lb 6.4 oz (61.9 kg)   Height: 5' 6.5\" (1.689 m)   PainSc:   0 - No pain   LMP: 2022       Hearing/Vision:   No exam data present    Learning Assessment:     Learning Assessment 2015   PRIMARY LEARNER Mother   HIGHEST LEVEL OF EDUCATION - PRIMARY LEARNER  4 YEARS OF COLLEGE   BARRIERS PRIMARY LEARNER NONE   CO-LEARNER CAREGIVER Yes   CO-LEARNER NAME Patient   BARRIERS CO-LEARNER NONE   PRIMARY LANGUAGE ENGLISH   PRIMARY LANGUAGE CO-LEARNER ENGLISH    NEED No   LEARNER PREFERENCE PRIMARY READING   LEARNER PREFERENCE CO-LEARNER READING   LEARNING SPECIAL TOPICS no   ANSWERED BY self   RELATIONSHIP SELF     Depression Screening:     3 most recent PHQ Screens 3/3/2022   Little interest or pleasure in doing things Not at all   Feeling down, depressed, irritable, or hopeless Not at all   Total Score PHQ 2 0   Trouble falling or staying asleep, or sleeping too much -   Feeling tired or having little energy -   Poor appetite, weight loss, or overeating -   Feeling bad about yourself - or that you are a failure or have let yourself or your family down -   Trouble concentrating on things such as school, work, reading, or watching TV -   Moving or speaking so slowly that other people could have noticed; or the opposite being so fidgety that others notice -   Thoughts of being better off dead, or hurting yourself in some way -   PHQ 9 Score -   How difficult have these problems made it for you to do your work, take care of your home and get along with others -     Fall Risk Assessment:     Fall Risk Assessment, last 12 mths 3/3/2022   Able to walk? Yes   Fall in past 12 months? 0   Do you feel unsteady?  0   Are you worried about falling 0     Abuse Screening:     Abuse Screening Questionnaire 3/3/2022   Do you ever feel afraid of your partner? N   Are you in a relationship with someone who physically or mentally threatens you? N   Is it safe for you to go home? Y     ADL Assessment:   No flowsheet data found. Coordination of Care Questionaire:   1. \"Have you been to the ER, urgent care clinic since your last visit? Hospitalized since your last visit? \" No    2. \"Have you seen or consulted any other health care providers outside of the [address] [place] [address] since your last visit? \" No     3. For patients aged 39-70: Has the patient had a colonoscopy / FIT/ Cologuard? NA - based on age      If the patient is female:    4. For patients aged 41-77: Has the patient had a mammogram within the past 2 years? NA - based on age or sex  See top three    5. For patients aged 21-65: Has the patient had a pap smear? Yes - no Care Gap present (doing today)    Advanced Directive:   1. Do you have an Advanced Directive? NO    2. Would you like information on Advanced Directives?  NO no

## 2022-03-06 LAB
A VAGINAE DNA VAG QL NAA+PROBE: NORMAL SCORE
BVAB2 DNA VAG QL NAA+PROBE: NORMAL SCORE
C ALBICANS DNA VAG QL NAA+PROBE: NEGATIVE
C GLABRATA DNA VAG QL NAA+PROBE: NEGATIVE
C TRACH RRNA SPEC QL NAA+PROBE: NEGATIVE
MEGA1 DNA VAG QL NAA+PROBE: NORMAL SCORE
N GONORRHOEA RRNA SPEC QL NAA+PROBE: NEGATIVE
SPECIMEN SOURCE: NORMAL
T VAGINALIS RRNA SPEC QL NAA+PROBE: NEGATIVE

## 2022-03-07 LAB
C TRACH RRNA SPEC QL NAA+PROBE: NEGATIVE
N GONORRHOEA RRNA SPEC QL NAA+PROBE: NEGATIVE
SPECIMEN SOURCE: NORMAL

## 2022-05-03 NOTE — PATIENT INSTRUCTIONS
To Do:  · Look into counseling at school  · \"be sloppy\" with the antifungal nail polish  · Let me know if you decide to try the zoloft thing         Anxiety Disorder: Care Instructions  Your Care Instructions    Anxiety is a normal reaction to stress. Difficult situations can cause you to have symptoms such as sweaty palms and a nervous feeling. In an anxiety disorder, the symptoms are far more severe. Constant worry, muscle tension, trouble sleeping, nausea and diarrhea, and other symptoms can make normal daily activities difficult or impossible. These symptoms may occur for no reason, and they can affect your work, school, or social life. Medicines, counseling, and self-care can all help. Follow-up care is a key part of your treatment and safety. Be sure to make and go to all appointments, and call your doctor if you are having problems. It's also a good idea to know your test results and keep a list of the medicines you take. How can you care for yourself at home? · Take medicines exactly as directed. Call your doctor if you think you are having a problem with your medicine. · Go to your counseling sessions and follow-up appointments. · Recognize and accept your anxiety. Then, when you are in a situation that makes you anxious, say to yourself, \"This is not an emergency. I feel uncomfortable, but I am not in danger. I can keep going even if I feel anxious. \"  · Be kind to your body:  ¨ Relieve tension with exercise or a massage. ¨ Get enough rest.  ¨ Avoid alcohol, caffeine, nicotine, and illegal drugs. They can increase your anxiety level and cause sleep problems. ¨ Learn and do relaxation techniques. See below for more about these techniques. · Engage your mind. Get out and do something you enjoy. Go to a funny movie, or take a walk or hike. Plan your day. Having too much or too little to do can make you anxious. · Keep a record of your symptoms.  Discuss your fears with a good friend or family member, or join a support group for people with similar problems. Talking to others sometimes relieves stress. · Get involved in social groups, or volunteer to help others. Being alone sometimes makes things seem worse than they are. · Get at least 30 minutes of exercise on most days of the week to relieve stress. Walking is a good choice. You also may want to do other activities, such as running, swimming, cycling, or playing tennis or team sports. Relaxation techniques  Do relaxation exercises 10 to 20 minutes a day. You can play soothing, relaxing music while you do them, if you wish. · Tell others in your house that you are going to do your relaxation exercises. Ask them not to disturb you. · Find a comfortable place, away from all distractions and noise. · Lie down on your back, or sit with your back straight. · Focus on your breathing. Make it slow and steady. · Breathe in through your nose. Breathe out through either your nose or mouth. · Breathe deeply, filling up the area between your navel and your rib cage. Breathe so that your belly goes up and down. · Do not hold your breath. · Breathe like this for 5 to 10 minutes. Notice the feeling of calmness throughout your whole body. As you continue to breathe slowly and deeply, relax by doing the following for another 5 to 10 minutes:  · Tighten and relax each muscle group in your body. You can begin at your toes and work your way up to your head. · Imagine your muscle groups relaxing and becoming heavy. · Empty your mind of all thoughts. · Let yourself relax more and more deeply. · Become aware of the state of calmness that surrounds you. · When your relaxation time is over, you can bring yourself back to alertness by moving your fingers and toes and then your hands and feet and then stretching and moving your entire body. Sometimes people fall asleep during relaxation, but they usually wake up shortly afterward.   · Always give yourself time to return to full alertness before you drive a car or do anything that might cause an accident if you are not fully alert. Never play a relaxation tape while you drive a car. When should you call for help? Call 911 anytime you think you may need emergency care. For example, call if:  ? · You feel you cannot stop from hurting yourself or someone else. ? Keep the numbers for these national suicide hotlines: 5-591-949-TALK (9-843.160.1123) and 4-193-KBMUREZ (6-676.870.4688). If you or someone you know talks about suicide or feeling hopeless, get help right away. ? Watch closely for changes in your health, and be sure to contact your doctor if:  ? · You have anxiety or fear that affects your life. ? · You have symptoms of anxiety that are new or different from those you had before. Where can you learn more? Go to http://ayahVigilisticsrosa.info/. Enter P754 in the search box to learn more about \"Anxiety Disorder: Care Instructions. \"  Current as of: May 12, 2017  Content Version: 11.4  © 1620-3311 [employer]. Care instructions adapted under license by [employer] (which disclaims liability or warranty for this information). If you have questions about a medical condition or this instruction, always ask your healthcare professional. [address] any warranty or liability for your use of this information. Recovering From Depression: Care Instructions  Your Care Instructions    Taking good care of yourself is important as you recover from depression. In time, your symptoms will fade as your treatment takes hold. Do not give up. Instead, focus your energy on getting better. Your mood will improve. It just takes some time. Focus on things that can help you feel better, such as being with friends and family, eating well, and getting enough rest. But take things slowly. Do not do too much too soon. You will begin to feel better gradually.   Follow-up care is a key part of your treatment and safety. Be sure to make and go to all appointments, and call your doctor if you are having problems. It's also a good idea to know your test results and keep a list of the medicines you take. How can you care for yourself at home? Be realistic  · If you have a large task to do, break it up into smaller steps you can handle, and just do what you can. · You may want to put off important decisions until your depression has lifted. If you have plans that will have a major impact on your life, such as marriage, divorce, or a job change, try to wait a bit. Talk it over with friends and loved ones who can help you look at the overall picture first.  · Reaching out to people for help is important. Do not isolate yourself. Let your family and friends help you. Find someone you can trust and confide in, and talk to that person. · Be patient, and be kind to yourself. Remember that depression is not your fault and is not something you can overcome with willpower alone. Treatment is necessary for depression, just like for any other illness. Feeling better takes time, and your mood will improve little by little. Stay active  · Stay busy and get outside. Take a walk, or try some other light exercise. · Talk with your doctor about an exercise program. Exercise can help with mild depression. · Go to a movie or concert. Take part in a Christian activity or other social gathering. Go to a ball game. · Ask a friend to have dinner with you. Take care of yourself  · Eat a balanced diet with plenty of fresh fruits and vegetables, whole grains, and lean protein. If you have lost your appetite, eat small snacks rather than large meals. · Avoid drinking alcohol or using illegal drugs. Do not take medicines that have not been prescribed for you. They may interfere with medicines you may be taking for depression, or they may make your depression worse. · Take your medicines exactly as they are prescribed.  You may start to feel better within 1 to 3 weeks of taking antidepressant medicine. But it can take as many as 6 to 8 weeks to see more improvement. If you have questions or concerns about your medicines, or if you do not notice any improvement by 3 weeks, talk to your doctor. · If you have any side effects from your medicine, tell your doctor. Antidepressants can make you feel tired, dizzy, or nervous. Some people have dry mouth, constipation, headaches, sexual problems, or diarrhea. Many of these side effects are mild and will go away on their own after you have been taking the medicine for a few weeks. Some may last longer. Talk to your doctor if side effects are bothering you too much. You might be able to try a different medicine. · Get enough sleep. If you have problems sleeping:  ¨ Go to bed at the same time every night, and get up at the same time every morning. ¨ Keep your bedroom dark and quiet. ¨ Do not exercise after 5:00 p.m. ¨ Avoid drinks with caffeine after 5:00 p.m. · Avoid sleeping pills unless they are prescribed by the doctor treating your depression. Sleeping pills may make you groggy during the day, and they may interact with other medicine you are taking. · If you have any other illnesses, such as diabetes, heart disease, or high blood pressure, make sure to continue with your treatment. Tell your doctor about all of the medicines you take, including those with or without a prescription. · Keep the numbers for these national suicide hotlines: 7-492-427-TALK (3-604.264.1295) and 8-606-BGVRIPJ (6-449.808.9775). If you or someone you know talks about suicide or feeling hopeless, get help right away. When should you call for help? Call 911 anytime you think you may need emergency care. For example, call if:  ? · You feel like hurting yourself or someone else. ? · Someone you know has depression and is about to attempt or is attempting suicide.    ?Call your doctor now or seek immediate medical care if:  ? · You hear voices. ? · Someone you know has depression and:  ¨ Starts to give away his or her possessions. ¨ Uses illegal drugs or drinks alcohol heavily. ¨ Talks or writes about death, including writing suicide notes or talking about guns, knives, or pills. ¨ Starts to spend a lot of time alone. ¨ Acts very aggressively or suddenly appears calm. ? Watch closely for changes in your health, and be sure to contact your doctor if:  ? · You do not get better as expected. Where can you learn more? Go to http://ayahStraprosa.info/. Enter V108 in the search box to learn more about \"Recovering From Depression: Care Instructions. \"  Current as of: May 12, 2017  Content Version: 11.4  © 2371-5969 [employer]. Care instructions adapted under license by [employer] (which disclaims liability or warranty for this information). If you have questions about a medical condition or this instruction, always ask your healthcare professional. [address] any warranty or liability for your use of this information. No

## 2022-05-18 DIAGNOSIS — N94.6 DYSMENORRHEA IN THE ADOLESCENT: Chronic | ICD-10-CM

## 2022-05-18 RX ORDER — NORGESTIMATE AND ETHINYL ESTRADIOL 7DAYSX3 28
KIT ORAL
Qty: 84 TABLET | Refills: 3 | Status: SHIPPED | OUTPATIENT
Start: 2022-05-18

## 2022-10-18 ENCOUNTER — TELEPHONE (OUTPATIENT)
Dept: FAMILY MEDICINE CLINIC | Age: 22
End: 2022-10-18

## 2022-10-18 ENCOUNTER — OFFICE VISIT (OUTPATIENT)
Dept: FAMILY MEDICINE CLINIC | Age: 22
End: 2022-10-18
Payer: COMMERCIAL

## 2022-10-18 VITALS
HEART RATE: 60 BPM | HEIGHT: 67 IN | BODY MASS INDEX: 22.13 KG/M2 | OXYGEN SATURATION: 100 % | DIASTOLIC BLOOD PRESSURE: 66 MMHG | RESPIRATION RATE: 16 BRPM | SYSTOLIC BLOOD PRESSURE: 109 MMHG | WEIGHT: 141 LBS | TEMPERATURE: 98 F

## 2022-10-18 DIAGNOSIS — L70.0 ACNE VULGARIS: ICD-10-CM

## 2022-10-18 DIAGNOSIS — Z23 ENCOUNTER FOR IMMUNIZATION: Primary | ICD-10-CM

## 2022-10-18 DIAGNOSIS — J45.990 EXERCISE-INDUCED ASTHMA: ICD-10-CM

## 2022-10-18 PROCEDURE — 90715 TDAP VACCINE 7 YRS/> IM: CPT | Performed by: STUDENT IN AN ORGANIZED HEALTH CARE EDUCATION/TRAINING PROGRAM

## 2022-10-18 PROCEDURE — 99214 OFFICE O/P EST MOD 30 MIN: CPT | Performed by: STUDENT IN AN ORGANIZED HEALTH CARE EDUCATION/TRAINING PROGRAM

## 2022-10-18 PROCEDURE — 90471 IMMUNIZATION ADMIN: CPT | Performed by: STUDENT IN AN ORGANIZED HEALTH CARE EDUCATION/TRAINING PROGRAM

## 2022-10-18 NOTE — TELEPHONE ENCOUNTER
Pt came in to dropped off paperwork. Upon looking at the form, it shows that it is a Medical Clearance Form and the pt was last seen on 03/03/2022. Advised the pt to scheduled an appointment with us to complete this form. Pt has been scheduled.      Future Appointments   Date Time Provider Yoselin Salcido   10/18/2022  1:00 PM Misty Collier DO [facility]

## 2022-10-18 NOTE — PROGRESS NOTES
Daisy Payne is a 20 y.o. female (: 2000) presenting to address:    Chief Complaint   Patient presents with    Physical     CPE    Form Completion     Flu Immunization administered 10/18/2022 by Davidson Beal with consent. Patient tolerated procedure well. No reactions noted. Vitals:    10/18/22 1303   BP: 109/66   Pulse: 60   Resp: 16   Temp: 98 °F (36.7 °C)   TempSrc: Temporal   SpO2: 100%   Weight: 141 lb (64 kg)   Height: 5' 6.5\" (1.689 m)   PainSc:   0 - No pain   LMP: 10/02/2022       Hearing/Vision:   No results found.     Learning Assessment:     Learning Assessment 2015   PRIMARY LEARNER Mother   HIGHEST LEVEL OF EDUCATION - PRIMARY LEARNER  4 YEARS OF COLLEGE   BARRIERS PRIMARY LEARNER NONE   CO-LEARNER CAREGIVER Yes   CO-LEARNER NAME Patient   BARRIERS CO-LEARNER NONE   PRIMARY LANGUAGE ENGLISH   PRIMARY LANGUAGE CO-LEARNER ENGLISH    NEED No   LEARNER PREFERENCE PRIMARY READING   LEARNER PREFERENCE CO-LEARNER READING   LEARNING SPECIAL TOPICS no   ANSWERED BY self   RELATIONSHIP SELF     Depression Screening:     3 most recent PHQ Screens 10/18/2022   Little interest or pleasure in doing things Not at all   Feeling down, depressed, irritable, or hopeless -   Total Score PHQ 2 -   Trouble falling or staying asleep, or sleeping too much -   Feeling tired or having little energy -   Poor appetite, weight loss, or overeating -   Feeling bad about yourself - or that you are a failure or have let yourself or your family down -   Trouble concentrating on things such as school, work, reading, or watching TV -   Moving or speaking so slowly that other people could have noticed; or the opposite being so fidgety that others notice -   Thoughts of being better off dead, or hurting yourself in some way -   PHQ 9 Score -   How difficult have these problems made it for you to do your work, take care of your home and get along with others -     Fall Risk Assessment:     Fall Risk Assessment, last 12 mths 3/3/2022   Able to walk? Yes   Fall in past 12 months? 0   Do you feel unsteady? 0   Are you worried about falling 0     Abuse Screening:     Abuse Screening Questionnaire 3/3/2022   Do you ever feel afraid of your partner? N   Are you in a relationship with someone who physically or mentally threatens you? N   Is it safe for you to go home? Y     ADL Assessment:   No flowsheet data found. Coordination of Care Questionaire:   1. \"Have you been to the ER, urgent care clinic since your last visit? Hospitalized since your last visit? \" No    2. \"Have you seen or consulted any other health care providers outside of the [address] [place] [address] since your last visit? \" No     3. For patients aged 39-70: Has the patient had a colonoscopy / FIT/ Cologuard? NA - based on age      If the patient is female:    4. For patients aged 41-77: Has the patient had a mammogram within the past 2 years? NA - based on age or sex  See top three    5. For patients aged 21-65: Has the patient had a pap smear? NA - based on age or sex    Advanced Directive:   1. Do you have an Advanced Directive? NO    2. Would you like information on Advanced Directives?  NO

## 2022-10-18 NOTE — TELEPHONE ENCOUNTER
----- Message from Destiny Kenia sent at 10/17/2022  4:18 PM EDT -----  Subject: Message to Provider    QUESTIONS  Information for Provider? Patient is going to drop off paperwork for   international travel that needs provider's signature. Will drop them off   on 10/18.   ---------------------------------------------------------------------------  --------------  Colette Dominguez INFO  [phone]; OK to leave message on voicemail  ---------------------------------------------------------------------------  --------------  SCRIPT ANSWERS  Relationship to Patient?  Self

## 2025-07-11 NOTE — PROGRESS NOTES
Note to patient:  The purpose of this note is to communicate optimally with the other physicians / APCs involved in your care. It is written using standard medical terminology. If you have questions regarding the details of the note, please contact my office to schedule an appointment to address your questions. Bakari Brown   Primary Care Office Visit - Problem-Oriented    : 2000   Bhanu Barros is a 24 y.o. female presenting for  Chief Complaint   Patient presents with    [facility]     acne            Assessment/Plan:       ICD-10-CM ICD-9-CM   1. Medication monitoring encounter  Z51.81 V58.83   2. Acne vulgaris  L70.0 706.1   3. Exercise-induced asthma  J45.990 493.81   4. Encounter for immunization  Z23 V03.89   5. Screen for STD (sexually transmitted disease)  Z11.3 V74.5   6. Screening for thyroid disorder  Z13.29 V77.0   7. Screening, lipid  Z13.220 V77.91   8. Encounter for vitamin deficiency screening  Z13.21 V77.99   9. Needs flu shot  Z23 V04.81     Acne vulgaris, moderate with hx of scarring: not well controlled  Some improvement with initial conservative therapies including topical retinoid and clindamycin but not sustained improvement. Will start Doxy 100mg daily and reevaluate after 3-4mo; Can increase to 100mg BID if not achieving adequate control  Given risks and monitoring required with Acutane, ordered baseline lipid and LFTs to be drawn. If escalation of treatment needed, would need visit to discuss risk/benefit and continued monitoring required with Acutane. Exercise induced asthma - well controlled with pretreatment use of Albuterol  Refills provided    Health maintenance   Encouraged to return when able to complete initial Pap screening. Screening labs ordered     Orders Placed This Encounter    Influenza Vaccine.  QUAD, PF, SYR (Afluria [id number])    CBC WITH AUTOMATED DIFF     Standing Status:   Future     Standing Expiration Date:   10/1/2022  METABOLIC PANEL, COMPREHENSIVE     Standing Status:   Future     Standing Expiration Date:   9/30/2022    TSH W/ REFLX FREE T4 IF ABNORMAL ([facility] Only)     Standing Status:   Future     Standing Expiration Date:   10/1/2022    HIV 1/2 AG/AB, 4TH GENERATION,W RFLX CONFIRM     Standing Status:   Future     Standing Expiration Date:   10/1/2022    CT/NG/T.VAGINALIS AMPLIFICATION     Standing Status:   Future     Standing Expiration Date:   9/30/2022     Order Specific Question:   Specimen source     Answer:   Urine, Clean Catch [507]    RPR     Standing Status:   Future     Standing Expiration Date:   9/30/2022    LIPID PANEL     Standing Status:   Future     Standing Expiration Date:   10/1/2022    VITAMIN D, 25 HYDROXY     Standing Status:   Future     Standing Expiration Date:   9/30/2022    doxycycline (VIBRAMYCIN) 100 mg capsule     Sig: Take 1 Capsule by mouth daily for 30 days. Dispense:  30 Capsule     Refill:  1    albuterol (PROVENTIL HFA, VENTOLIN HFA, PROAIR HFA) 90 mcg/actuation inhaler     Sig: Take 1 Puff by inhalation every four (4) hours as needed for Wheezing, Shortness of Breath or Cough. Dispense:  18 g     Refill:  4    adapalene (DIFFERIN) 0.1 % topical gel     Sig: Apply  to affected area nightly. use small amount as directed     Dispense:  45 g     Refill:  1       Follow-up and Dispositions    · Return for when able to complete PAP and lab orders in place. This document may have been created with the aid of dictation software. Text may contain errors, particularly phonetic errors. Reviewed management plan & instructions with patient, who voiced understanding.          Lorena Eugene DO  Family Medicine  9/30/21    Subjective   History:   Miguelina Smith is a 24 y.o. female presenting to address:  Chief Complaint   Patient presents with    Establish Care     acne       Last visit: 7-10-20     HPI  Since last visit:   Any changes in medication, procedures, hospital visits, or specialist visits? Denies  Tolerating medications without adverse reactions? Reports good compliance with topical acne treatments which seem to initially have success but notes improvement is limited. Hx of scarring on chin from acne. Denies other acute concerns     Past Medical History:   Diagnosis Date    Asthma     exercise-induced; no hospitalizations, intubations    Closed fracture of distal phalanx of fourth finger of right hand - 1/05/2014 1/5/2014    Seen @ [facility] on 1/05/2014. No extension of fx into joint line. Roly taped.  Heavy menses     Pneumonia Jan 2010     Past Surgical History:   Procedure Laterality Date    HX OTHER SURGICAL  6th grade    cyst removed from right side of face    HX TONSILLECTOMY  age 10      reports that she has never smoked. She has never used smokeless tobacco. She reports that she does not drink alcohol and does not use drugs. Social History     Social History Narrative    Applied for grad school fullbright in [employer] [facility] or [employer]. 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     (10/4/2019)     Social History     Tobacco Use   Smoking Status Never Smoker   Smokeless Tobacco Never Used     Family History   Problem Relation Age of Onset    Depression Father     Asthma Mother     Asthma Sister      No Known Allergies    Problem List:      Patient Active Problem List    Diagnosis    Vitamin D deficiency     - VitD 24.8 (11/20/2015) -> 50k weekly x 12wks      Routine health maintenance     *8/21/2014: UTD on vaccines  - [employer] handout given to mom & patient      Nail fungus     *8/21/2014: probable fungal infection of lateral aspect of right 5th finger nailbed  - trial of topical antifungal      Dysmenorrhea in the adolescent    Exercise-induced asthma     *8/21/2014:   - continue xopenex prn  - Asthma Action Plan completed & given to mom; copy scanned into chart        Allergic rhinitis    Acne Medications:     Current Outpatient Medications   Medication Sig    doxycycline (VIBRAMYCIN) 100 mg capsule Take 1 Capsule by mouth daily for 30 days.  albuterol (PROVENTIL HFA, VENTOLIN HFA, PROAIR HFA) 90 mcg/actuation inhaler Take 1 Puff by inhalation every four (4) hours as needed for Wheezing, Shortness of Breath or Cough.  adapalene (DIFFERIN) 0.1 % topical gel Apply  to affected area nightly. use small amount as directed    Tri-Previfem, 28, 0.18/0.215/0.25 mg-35 mcg (28) tab TAKE 1 TABLET BY MOUTH DAILY    Inhalational Spacing Device (AEROCHAMBER MV) 1 Each by Does Not Apply route as needed. No current facility-administered medications for this visit. Review of Systems:     A comprehensive review of systems was negative except for that written in the HPI       Objective     Physical Assessment:     Visit Vitals  /79 (BP 1 Location: Left upper arm, BP Patient Position: Sitting)   Pulse 61   Temp 98.3 °F (36.8 °C) (Temporal)   Resp 16   Ht 5' 6.5\" (1.689 m)   Wt 136 lb (61.7 kg)   LMP 09/14/2021   SpO2 98%   BMI 21.62 kg/m²       Physical Exam  Vitals and nursing note reviewed. Constitutional:       Appearance: Normal appearance. Eyes:      Extraocular Movements: Extraocular movements intact. Conjunctiva/sclera: Conjunctivae normal.   Cardiovascular:      Rate and Rhythm: Normal rate and regular rhythm. Pulses: Normal pulses. Heart sounds: Normal heart sounds. Pulmonary:      Effort: Pulmonary effort is normal. No respiratory distress. Breath sounds: Normal breath sounds. Abdominal:      General: There is no distension. Skin:     Comments: Comedones noted on face   Neurological:      Mental Status: She is alert.    Psychiatric:         Mood and Affect: Mood normal.         Behavior: Behavior normal. [No Acute Distress] : no acute distress [Well Nourished] : well nourished [Well Developed] : well developed [Well-Appearing] : well-appearing [Normal Sclera/Conjunctiva] : normal sclera/conjunctiva [PERRL] : pupils equal round and reactive to light [EOMI] : extraocular movements intact [Normal Outer Ear/Nose] : the outer ears and nose were normal in appearance [Normal Oropharynx] : the oropharynx was normal [No JVD] : no jugular venous distention [No Lymphadenopathy] : no lymphadenopathy [Supple] : supple [Thyroid Normal, No Nodules] : the thyroid was normal and there were no nodules present [No Respiratory Distress] : no respiratory distress  [No Accessory Muscle Use] : no accessory muscle use [Clear to Auscultation] : lungs were clear to auscultation bilaterally [Normal Rate] : normal rate  [Regular Rhythm] : with a regular rhythm [Normal S1, S2] : normal S1 and S2 [No Murmur] : no murmur heard [No Carotid Bruits] : no carotid bruits [No Abdominal Bruit] : a ~M bruit was not heard ~T in the abdomen [No Varicosities] : no varicosities [Pedal Pulses Present] : the pedal pulses are present [No Edema] : there was no peripheral edema [No Palpable Aorta] : no palpable aorta [No Extremity Clubbing/Cyanosis] : no extremity clubbing/cyanosis [Soft] : abdomen soft [Non Tender] : non-tender [Non-distended] : non-distended [No Masses] : no abdominal mass palpated [No HSM] : no HSM [Normal Bowel Sounds] : normal bowel sounds [Normal Posterior Cervical Nodes] : no posterior cervical lymphadenopathy [Normal Anterior Cervical Nodes] : no anterior cervical lymphadenopathy [No CVA Tenderness] : no CVA  tenderness [No Spinal Tenderness] : no spinal tenderness [No Joint Swelling] : no joint swelling [Grossly Normal Strength/Tone] : grossly normal strength/tone [No Rash] : no rash [Coordination Grossly Intact] : coordination grossly intact [No Focal Deficits] : no focal deficits [Normal Gait] : normal gait [Deep Tendon Reflexes (DTR)] : deep tendon reflexes were 2+ and symmetric [Normal Affect] : the affect was normal [Normal Insight/Judgement] : insight and judgment were intact